# Patient Record
Sex: FEMALE | Race: WHITE | NOT HISPANIC OR LATINO | Employment: UNEMPLOYED | URBAN - METROPOLITAN AREA
[De-identification: names, ages, dates, MRNs, and addresses within clinical notes are randomized per-mention and may not be internally consistent; named-entity substitution may affect disease eponyms.]

---

## 2017-03-13 ENCOUNTER — ALLSCRIPTS OFFICE VISIT (OUTPATIENT)
Dept: OTHER | Facility: OTHER | Age: 37
End: 2017-03-13

## 2018-01-11 NOTE — PROGRESS NOTES
2016         RE: Karla Francissantiago                                    To: [facility]  MR#: [id number]                                    [address]   : [address]                                  [id number]   ENC: [id number]                             Alex, [address]   (Exam #: [id number])                           Fax: [phone]      The LMP of this 37year old,  3, para 2 patient was 2015,   giving her an TERRI of 2016 and a current gestational age of 36 weeks   0 days by dates  A sonographic examination was performed on 2016   using real time equipment  The ultrasound examination was performed using   abdominal technique  The patient has a BMI of 31 5  Her blood pressure   today was 135/81  Earliest ultrasound found in her record: 2015  11w2d  2016 TERRI      Cardiac motion was observed at 159 bpm       INDICATIONS      post dates      Exam Types      Amniotic Fluid Index      RESULTS      Fetus # 1 of 1   Vertex presentation   Placenta Location = Anterior   No placenta previa   Placenta Grade = II      AMNIOTIC FLUID      Q1: 2 6      Q2: 1 4      Q3: 2 9      Q4:   RICHARD Total = 6 9 cm   Amniotic Fluid: Normal      BIOPHYSICAL PROFILE      The Biophysical Profile score was 8/8  Breathin  Movement: 2  Tone: 2  AFV: 2      IMPRESSION      Guzman IUP   Vertex presentation   Regular fetal heart rate of 159 bpm   Anterior placenta   No placenta previa      GENERAL COMMENT         The nonstress test today showed a normal baseline, the presence of   accelerations, moderate variability, and no decelerations and thus the NST   was reassuring  The amniotic fluid was normal today as well and thus both   of these tests showed reassuring results  SHILA Amaya , R DELMIS Smith     Maternal-Fetal Medicine   Electronically signed 16 16:26

## 2018-01-12 VITALS
HEIGHT: 70 IN | SYSTOLIC BLOOD PRESSURE: 110 MMHG | DIASTOLIC BLOOD PRESSURE: 72 MMHG | WEIGHT: 196 LBS | BODY MASS INDEX: 28.06 KG/M2

## 2018-01-12 NOTE — PROGRESS NOTES
2016         RE: Edward Subharodney                                    To: [facility]  MR#: [id number]                                    [address]   : 1980                                  [address]   [facility]: [id number]:[facility]                             Nusrat Puente, [address]   (Exam #: [id number])                           Fax: [phone]      The LMP of this 37year old,  3, para 2 patient was 2015,   giving her an TERRI of 2016 and a current gestational age of 36 weeks   4 days by dates  A sonographic examination was performed on 2016   using real time equipment  The ultrasound examination was performed using   abdominal technique  The patient has a BMI of 30 8  Her blood pressure   today was 136/87  Earliest ultrasound found in her record: 2015  11w2d  2016 TERRI      Bashir Avery has no complaints her visit today  She reports regular fetal   movements  She is scheduled for admission for cervical ripening and labor   induction tomorrow night  Cardiac motion was observed at 146 bpm       INDICATIONS      post dates      Exam Types      Level I   NST      RESULTS      Fetus # 1 of 1   Vertex presentation   Fetal growth appeared normal   Placenta Location = Anterior   No placenta previa   Placenta Grade = II      The NST was reactive with no decelerations  MEASUREMENTS (* Included In Average GA)      AC              33 6 cm        37 weeks 6 days*   BPD              9 5 cm        38 weeks 5 days*   HC              34 7 cm        39 weeks 4 days*   Femur            8 1 cm        40 weeks 2 days*      Cerebellum       5 5 cm        36 weeks 4 days      HC/AC           1 03   FL/AC           0 24   FL/BPD          0 85   EFW (Ac/Fl/Hc)  3634 grams - 8 lbs 0 oz      THE AVERAGE GESTATIONAL AGE is 39 weeks 1 day +/- 21 days        AMNIOTIC FLUID      Q1: 6 1      Q2: 2 0      Q3: 1 1      Q4:   RICHARD Total = 9 1 cm   Amniotic Fluid: Normal IMPRESSION      Guzman IUP   39 weeks and 1 day by this ultrasound  (TERRI=APR 24 2016)   Vertex presentation   Fetal growth appeared normal   Regular fetal heart rate of 146 bpm   Anterior placenta   No placenta previa      GENERAL COMMENT      No fetal structural abnormality is identified on the Level I survey today   in a limited study secondary to the constraints related to the late   gestational age  Cardiac anatomy is suboptimally imaged  The fetal brain,   stomach, kidneys, bladder, and diaphragm appear normal  Fetal interval   growth and amniotic fluid volume are normal       Today's ultrasound findings and suggested follow-up were discussed in   detail with Arun Nicole  We agree with labor induction at this gestational age  No further appointment has been scheduled in the Count includes the [facility], [facility]  for Arun Nicole   at this time  Follow-up M ultrasound evaluation is recommended as   clinically indicated  Daily third trimester fetal kick counting was   discussed at the visit today  The face to face time, in addition to time spent discussing ultrasound   results, was 10 minutes, greater than 50% of which was spent during   counseling and coordination of care  SHILA Schuster M D     Maternal-Fetal Medicine   Electronically signed 04/18/16 14:07

## 2018-01-15 NOTE — MISCELLANEOUS
Chief Complaint  Chief Complaint Free Text Note Form: No STAN was needed for this patient because she was in the hospital for the birth of a child  Active Problems    1  History of macrosomia in infant in prior pregnancy, currently pregnant (V23 49) (O09 299)   2  Need for prophylactic vaccination and inoculation against influenza (V04 81) (Z23)   3  Need for prophylactic vaccination with combined diphtheria-tetanus-pertussis (DTaP)   vaccine (V06 1) (Z23)   4  Postprocedural hypothyroidism (244 0) (E89 0)   5  Post-term pregnancy, 40-42 weeks of gestation (645 10) (O48 0)   6  Pregnancy (V22 2) (Z33 1)   7  Short interval between pregnancies affecting pregnancy in third trimester, antepartum   (V23 89) (O09 893)   8  Urinary frequency (788 41) (R35 0)    Past Medical History    1  History of Age At First Period 17 Years Old (Menarche)   2  History of Asthma (493 90) (J45 909)   3  History of Bunion, unspecified laterality (727 1) (M20 10)   4  History of Contraception (V25 9) (Z30 9)   5  History of Encounter for routine gynecological examination (V72 31) (Z01 419)   6  History of  2 (V22 2) (Z33 1)   7  History of Group B streptococcal infection (041 02) (A49 1)   8  History of headache (V13 89) (Z87 898)   9  History of hypothyroidism (V12 29) (Z86 39)   10  History of Oral contraceptive use (V25 41) (Z30 41)   11  History of  (spontaneous vaginal delivery) (650) (O80)    Surgical History    1  History of Foot Surgery   2  History of Oral Surgery Tooth Extraction   3  Thyroid Surgery Thyroid Lobectomy Right Lobe    Family History    1  Family history of hypertension (V17 49) (Z82 49)    2  Family history of hypertension (V17 49) (Z82 49)    3  Family history of Diabetes Mellitus (V18 0)    4  Family history of Diabetes Mellitus (V18 0)   5  Family history of Thyroid Disorder (V18 19)    6  Family history of Diabetes Mellitus (V18 0)    7   Family history of type 1 diabetes mellitus (V18 0) (Z83 3)    Social History    · Being A Social Drinker   · Currently sexually active   · Daily Coffee Consumption (2  Cups/Day)   · Drinks beer (V49 89) (Z78 9)   · Denied: History of Drug Use   · Exercise: Running   · Exercising Regularly   · Marital History - Currently    · Never A Smoker   · Uses Safety Equipment - Seatbelts    Current Meds   1  Complete Prenatal/DHA MISC; Therapy: (Recorded:29Rky8409) to Recorded   2  Tylenol Extra Strength 500 MG Oral Tablet; Therapy: (Recorded:55Nhp4089) to Recorded    Allergies    1  Penicillins    2  Animal dander - Cats   3  No Known Environmental Allergies   4  No Known Food Allergies    Future Appointments    Date/Time Provider Specialty Site   05/31/2016 09:20 AM DELMIS Casey   Obstetrics/Gynecology [facility]   Electronically signed by : Quinn Kimbrough, ; Apr 25 2016 10:14AM EST                       (Author)    Electronically signed by : DELMIS Lange ; Apr 25 2016 12:37PM EST                       (Author)

## 2018-04-04 ENCOUNTER — ANNUAL EXAM (OUTPATIENT)
Dept: OBGYN CLINIC | Facility: CLINIC | Age: 38
End: 2018-04-04
Payer: COMMERCIAL

## 2018-04-04 VITALS
BODY MASS INDEX: 29.49 KG/M2 | WEIGHT: 206 LBS | DIASTOLIC BLOOD PRESSURE: 66 MMHG | HEIGHT: 70 IN | SYSTOLIC BLOOD PRESSURE: 118 MMHG

## 2018-04-04 DIAGNOSIS — Z01.419 ENCOUNTER FOR GYNECOLOGICAL EXAMINATION (GENERAL) (ROUTINE) WITHOUT ABNORMAL FINDINGS: Primary | ICD-10-CM

## 2018-04-04 DIAGNOSIS — Z11.51 SCREENING FOR HPV (HUMAN PAPILLOMAVIRUS): ICD-10-CM

## 2018-04-04 PROCEDURE — 87624 HPV HI-RISK TYP POOLED RSLT: CPT | Performed by: PHYSICIAN ASSISTANT

## 2018-04-04 PROCEDURE — G0145 SCR C/V CYTO,THINLAYER,RESCR: HCPCS | Performed by: PHYSICIAN ASSISTANT

## 2018-04-04 PROCEDURE — S0612 ANNUAL GYNECOLOGICAL EXAMINA: HCPCS | Performed by: PHYSICIAN ASSISTANT

## 2018-04-04 NOTE — PROGRESS NOTES
Assessment/Plan  Problem List Items Addressed This Visit     Encounter for gynecological examination (general) (routine) without abnormal findings - Primary     Annual exam and pap performed, will call if abnormal or send letter if normal   RTO 1 year             Subjective   Kj Squires is a 30 y o  female who presents for annual GYN exam  She denies any changes in Medical, Surgical or Family  Periods are regular every 28-30 days  Dysmenorrhea:none  She denies intermenstrual bleeding, spotting, or discharge  She reports that she is sexually active with 1 partner and using vasectomy for contraception  Last Pap smear: 3/2015 WNL, HPV negative  Regular self breast exam: no    Family history of uterine or ovarian cancer: no  Family history of breast cancer: no  Family history of colon cancer: no    Menstrual History:  OB History      Para Term  AB Living    3 3 3     3    SAB TAB Ectopic Multiple Live Births          0 3        Obstetric Comments    MENARCHE: 17YEARS OLD        Patient's last menstrual period was 2018 (approximate)  Period Cycle (Days): 30  Period Duration (Days): 4-5  Period Pattern: Regular  Menstrual Flow: Heavy, Light  Menstrual Control: Thin pad  Dysmenorrhea: None    The following portions of the patient's history were reviewed and updated as appropriate: allergies, current medications, past family history, past medical history, past social history, past surgical history and problem list     Review of Systems  Review of Systems   Constitutional: Negative for chills and fever  Respiratory: Negative for shortness of breath  Cardiovascular: Negative for chest pain  Gastrointestinal: Negative for abdominal pain  Genitourinary: Negative for dysuria, pelvic pain, vaginal bleeding, vaginal discharge and vaginal pain  Negative for breast pain or lumps  (+) for occasional stress urinary incontinence  Neurological: Negative for headaches       Objective   BP 118/66 (BP Location: Right arm, Patient Position: Sitting, Cuff Size: Standard)   Ht 5' 9 5" (1 765 m)   Wt 93 4 kg (206 lb)   LMP 03/22/2018 (Approximate)   Breastfeeding? No   BMI 29 98 kg/m²     Physical Exam   Constitutional: She is oriented to person, place, and time  She appears well-developed and well-nourished  Neck: No thyromegaly present  Cardiovascular: Normal rate and regular rhythm  Pulmonary/Chest: Effort normal and breath sounds normal  Right breast exhibits no mass, no nipple discharge, no skin change and no tenderness  Left breast exhibits no mass, no nipple discharge, no skin change and no tenderness  Abdominal: Soft  There is no tenderness  There is no rebound and no guarding  Genitourinary: There is no rash or lesion on the right labia  There is no rash or lesion on the left labia  Uterus is not enlarged and not tender  Cervix exhibits no motion tenderness and no discharge  Right adnexum displays no mass and no tenderness  Left adnexum displays no mass and no tenderness  No bleeding in the vagina  No vaginal discharge found  Genitourinary Comments: Pap performed   Neurological: She is alert and oriented to person, place, and time  Psychiatric: She has a normal mood and affect  Nursing note and vitals reviewed

## 2018-04-06 LAB — HPV RRNA GENITAL QL NAA+PROBE: NORMAL

## 2018-04-09 LAB
LAB AP GYN PRIMARY INTERPRETATION: NORMAL
LAB AP LMP: NORMAL
Lab: NORMAL

## 2019-04-11 ENCOUNTER — ANNUAL EXAM (OUTPATIENT)
Dept: OBGYN CLINIC | Facility: CLINIC | Age: 39
End: 2019-04-11
Payer: COMMERCIAL

## 2019-04-11 VITALS
SYSTOLIC BLOOD PRESSURE: 114 MMHG | HEIGHT: 70 IN | WEIGHT: 192 LBS | BODY MASS INDEX: 27.49 KG/M2 | DIASTOLIC BLOOD PRESSURE: 72 MMHG

## 2019-04-11 DIAGNOSIS — Z01.419 ENCOUNTER FOR GYNECOLOGICAL EXAMINATION (GENERAL) (ROUTINE) WITHOUT ABNORMAL FINDINGS: Primary | ICD-10-CM

## 2019-04-11 PROCEDURE — S0612 ANNUAL GYNECOLOGICAL EXAMINA: HCPCS | Performed by: PHYSICIAN ASSISTANT

## 2020-01-21 ENCOUNTER — TELEPHONE (OUTPATIENT)
Dept: INTERNAL MEDICINE CLINIC | Facility: CLINIC | Age: 40
End: 2020-01-21

## 2020-02-05 ENCOUNTER — TELEPHONE (OUTPATIENT)
Dept: INTERNAL MEDICINE CLINIC | Facility: CLINIC | Age: 40
End: 2020-02-05

## 2020-02-10 NOTE — TELEPHONE ENCOUNTER
02/10/20 7:26 AM     Thank you for your request  Your request has been received and reviewed  After careful review it was noted that the patient has been seen by the listed PCP and/or in the office within the past three years  Per protocol guidelines, this patient should remain on the patient panel  Please refer to [facility] tip sheet [id number] to review PCP Field Guidelines as defined by Leadership       Thank you  Valeri Fernandez

## 2020-02-20 NOTE — TELEPHONE ENCOUNTER
02/20/20 1:49 PM     Thank you for your request  Your request has been received, reviewed, and the patient chart updated  The PCP has successfully been removed with a patient attribution note  This message will now be completed  Thank you  Erickson Yeung    My apologies, I was looking at the referral name in 2017  Updated

## 2020-03-04 ENCOUNTER — OFFICE VISIT (OUTPATIENT)
Dept: INTERNAL MEDICINE CLINIC | Facility: CLINIC | Age: 40
End: 2020-03-04
Payer: COMMERCIAL

## 2020-03-04 VITALS
WEIGHT: 197.2 LBS | HEIGHT: 70 IN | SYSTOLIC BLOOD PRESSURE: 122 MMHG | BODY MASS INDEX: 28.23 KG/M2 | DIASTOLIC BLOOD PRESSURE: 78 MMHG | OXYGEN SATURATION: 96 % | HEART RATE: 84 BPM

## 2020-03-04 DIAGNOSIS — Z00.00 WELLNESS EXAMINATION: Primary | ICD-10-CM

## 2020-03-04 DIAGNOSIS — Z13.220 SCREENING, LIPID: ICD-10-CM

## 2020-03-04 DIAGNOSIS — Z13.1 SCREENING FOR DIABETES MELLITUS: ICD-10-CM

## 2020-03-04 PROBLEM — Z01.419 ENCOUNTER FOR GYNECOLOGICAL EXAMINATION (GENERAL) (ROUTINE) WITHOUT ABNORMAL FINDINGS: Status: RESOLVED | Noted: 2018-04-04 | Resolved: 2020-03-04

## 2020-03-04 PROCEDURE — 99385 PREV VISIT NEW AGE 18-39: CPT | Performed by: INTERNAL MEDICINE

## 2020-03-04 NOTE — PROGRESS NOTES
Assessment/Plan:      BMI Counseling: Body mass index is 28 3 kg/m²  The BMI is above normal  Exercise recommendations include exercising 3-5 times per week  Declined flu vaccine       Problem List Items Addressed This Visit     None      Visit Diagnoses     Wellness examination    -  Primary    Screening, lipid        Relevant Orders    Lipid panel    Screening for diabetes mellitus        Relevant Orders    Glucose, fasting            Subjective:      Patient ID: Trang Bartholomew is a 42 y o  female  HPI  Here for a wellness  I have not seen her in > 3 years  She received a certified letter in the mail to schedule a visit  Since her last visit, she has had 3 girls-7, 11 and 3 y/o  She is a teacher  She is generally doing well  No complications during her pregnancies  She denies smoking  She drinks socially  She has not had time to exercise but is active with her kids    The following portions of the patient's history were reviewed and updated as appropriate: allergies, past family history, past medical history, past social history, past surgical history and problem list     Review of Systems   Constitutional: Negative for chills, fatigue, fever and unexpected weight change  HENT: Negative for congestion, sinus pressure, sinus pain and sore throat  Respiratory: Negative for cough, shortness of breath and wheezing  Cardiovascular: Negative for chest pain, palpitations and leg swelling  Gastrointestinal: Negative for abdominal pain, constipation, diarrhea, nausea and vomiting  Genitourinary: Negative for difficulty urinating and menstrual problem  Musculoskeletal: Negative for arthralgias and myalgias  Neurological: Negative for dizziness and headaches  Objective:      /78   Pulse 84   Ht 5' 10" (1 778 m)   Wt 89 4 kg (197 lb 3 2 oz)   SpO2 96%   BMI 28 30 kg/m²          Physical Exam   Constitutional: She is oriented to person, place, and time   She appears well-developed and well-nourished  HENT:   Head: Normocephalic and atraumatic  Right Ear: External ear normal    Left Ear: External ear normal    Mouth/Throat: Oropharynx is clear and moist    Eyes: Conjunctivae are normal    Neck: Neck supple  Cardiovascular: Normal rate, regular rhythm and normal heart sounds  No murmur heard  Pulmonary/Chest: Effort normal and breath sounds normal  No respiratory distress  She has no wheezes  She has no rales  Abdominal: Soft  She exhibits no distension and no mass  There is no tenderness  There is no rebound and no guarding  Neurological: She is alert and oriented to person, place, and time  Skin: Skin is warm and dry  Psychiatric: She has a normal mood and affect   Her behavior is normal  Judgment and thought content normal

## 2020-08-26 ENCOUNTER — ANNUAL EXAM (OUTPATIENT)
Dept: OBGYN CLINIC | Facility: CLINIC | Age: 40
End: 2020-08-26
Payer: COMMERCIAL

## 2020-08-26 VITALS
SYSTOLIC BLOOD PRESSURE: 120 MMHG | BODY MASS INDEX: 30.66 KG/M2 | TEMPERATURE: 97.4 F | WEIGHT: 207 LBS | DIASTOLIC BLOOD PRESSURE: 74 MMHG | HEIGHT: 69 IN

## 2020-08-26 DIAGNOSIS — Z01.419 ENCNTR FOR GYN EXAM (GENERAL) (ROUTINE) W/O ABN FINDINGS: Primary | ICD-10-CM

## 2020-08-26 PROCEDURE — S0612 ANNUAL GYNECOLOGICAL EXAMINA: HCPCS | Performed by: PHYSICIAN ASSISTANT

## 2020-08-26 PROCEDURE — 3008F BODY MASS INDEX DOCD: CPT | Performed by: INTERNAL MEDICINE

## 2020-08-26 PROCEDURE — 3008F BODY MASS INDEX DOCD: CPT | Performed by: PHYSICIAN ASSISTANT

## 2020-08-26 NOTE — PROGRESS NOTES
Sienna Oseas  1980      CC:  Yearly exam    S:  42 y o  female here for yearly exam  Her cycles are regular, not heavy or crampy  Sexual activity: She is sexually active without pain, bleeding or dryness  Contraception: She uses vasectomy for contraception  She works as a teacher  Her 3 children will be going to [employer] this fall  Last Pap 4/4/18 neg/neg  Last Mammo never    We reviewed ASCCP guidelines for Pap testing today  Family hx of breast cancer: no  Family hx of ovarian cancer: no  Family hx of colon cancer: no    No current outpatient medications on file    Social History     Socioeconomic History    Marital status: /Civil Union     Spouse name: Pam Rangel    Number of children: 2    Years of education: Not on file    Highest education level: Not on file   Occupational History    Not on file   Social Needs    Financial resource strain: Not on file    Food insecurity     Worry: Not on file     Inability: Not on file    Transportation needs     Medical: Not on file     Non-medical: Not on file   Tobacco Use    Smoking status: Never Smoker    Smokeless tobacco: Never Used   Substance and Sexual Activity    Alcohol use: Yes     Frequency: 2-3 times a week     Drinks per session: 1 or 2     Binge frequency: Weekly    Drug use: No    Sexual activity: Yes     Partners: Male     Birth control/protection: Male Sterilization   Lifestyle    Physical activity     Days per week: Not on file     Minutes per session: Not on file    Stress: Not on file   Relationships    Social connections     Talks on phone: Not on file     Gets together: Not on file     Attends Gnosticism service: Not on file     Active member of club or organization: Not on file     Attends meetings of clubs or organizations: Not on file     Relationship status: Not on file    Intimate partner violence     Fear of current or ex partner: Not on file     Emotionally abused: Not on file     Physically abused: Not on file     Forced sexual activity: Not on file   Other Topics Concern    Not on file   Social History Narrative    Activities: treadmill    Daily coffee consumption (1 cups/day)    Exercise: running    Exercise: walking    Exercising regularly    Uses safety equipment- seatbelts      Family History   Problem Relation Age of Onset    Asthma Father     Hypertension Father     Diabetes Maternal Aunt     Thyroid disease Maternal Aunt     Diabetes Maternal Uncle     Diabetes Maternal Grandmother     Hypertension Mother     Diabetes type I Other     No Known Problems Sister     No Known Problems Sister       Past Medical History:   Diagnosis Date    Asthma     Bunion     RESOLVED: 2001    Disease of thyroid gland     Encounter for gynecological examination (general) (routine) without abnormal findings 4/4/2018    Hypothyroidism     Varicella     childhood        Review of Systems   Respiratory: Negative  Cardiovascular: Negative  Gastrointestinal: Negative for constipation and diarrhea  Genitourinary: Negative for difficulty urinating, pelvic pain, vaginal bleeding, vaginal discharge, itching or odor  O:  Blood pressure 120/74, temperature (!) 97 4 °F (36 3 °C), temperature source Tympanic, height 5' 9 29" (1 76 m), weight 93 9 kg (207 lb), last menstrual period 08/03/2020, not currently breastfeeding  Patient appears well and is not in distress  Neck is supple without masses  Breasts are symmetrical without mass, tenderness, nipple discharge, skin changes or adenopathy  Abdomen is soft and nontender without masses  External genitals are normal without lesions or rashes  Urethral meatus and urethra are normal  Bladder is normal to palpation  Vagina is normal without discharge or bleeding  Cervix is normal without discharge or lesion  Uterus is normal, mobile, nontender without palpable mass  Adnexa are normal, nontender, without palpable mass       A:  Yearly exam  P:   Pap 2023      RTO one year for yearly exam or sooner as needed

## 2021-03-26 DIAGNOSIS — Z23 ENCOUNTER FOR IMMUNIZATION: ICD-10-CM

## 2021-11-02 ENCOUNTER — ANNUAL EXAM (OUTPATIENT)
Dept: OBGYN CLINIC | Facility: CLINIC | Age: 41
End: 2021-11-02
Payer: COMMERCIAL

## 2021-11-02 VITALS
BODY MASS INDEX: 26.03 KG/M2 | SYSTOLIC BLOOD PRESSURE: 126 MMHG | DIASTOLIC BLOOD PRESSURE: 74 MMHG | WEIGHT: 181.8 LBS | HEIGHT: 70 IN

## 2021-11-02 DIAGNOSIS — Z01.419 ENCNTR FOR GYN EXAM (GENERAL) (ROUTINE) W/O ABN FINDINGS: Primary | ICD-10-CM

## 2021-11-02 PROCEDURE — S0612 ANNUAL GYNECOLOGICAL EXAMINA: HCPCS | Performed by: PHYSICIAN ASSISTANT

## 2023-02-15 ENCOUNTER — ANNUAL EXAM (OUTPATIENT)
Dept: OBGYN CLINIC | Facility: CLINIC | Age: 43
End: 2023-02-15

## 2023-02-15 VITALS
BODY MASS INDEX: 26.66 KG/M2 | HEIGHT: 72 IN | DIASTOLIC BLOOD PRESSURE: 94 MMHG | WEIGHT: 196.8 LBS | SYSTOLIC BLOOD PRESSURE: 124 MMHG

## 2023-02-15 DIAGNOSIS — Z01.419 ROUTINE GYNECOLOGICAL EXAMINATION: Primary | ICD-10-CM

## 2023-02-15 DIAGNOSIS — N39.41 URGENCY INCONTINENCE: ICD-10-CM

## 2023-02-15 DIAGNOSIS — Z11.51 SCREENING FOR HPV (HUMAN PAPILLOMAVIRUS): ICD-10-CM

## 2023-02-15 DIAGNOSIS — Z12.31 ENCOUNTER FOR SCREENING MAMMOGRAM FOR MALIGNANT NEOPLASM OF BREAST: ICD-10-CM

## 2023-02-15 NOTE — PATIENT INSTRUCTIONS
FOODS THAT MAY BE BLADDER IRRITANTS    The following selections show the most common foods which may worsen or irritate the bladder in patients with overactive bladder or interstitial cystitis  This does not mean that you need to avoid all of these foods, but you should pay close attention to how you feel after eating them  If you feel worse, then there is a good chance that this is a trigger food for you  Cranberry Juices and Extracts:   Cranberry juice may be the most frequent irritant in a patient’s diet  Recommended for consumption during urinary tract infections, cranberry juice can be very difficult for an overactive bladder to tolerate  If you don’t wish to give it up entirely, try diluting it with water by half or more  Coffee and Tea Products: In a sensitive bladder, coffee is believed to be the top bladder irritant  Some people can tolerate low acid coffees, while others try teas  For the most sensitive patient, the best option for a hot drink is hot water with honey  Carbonated beverages an sodas of any type (diet and regular): The most difficult soda to tolerate appears to be diet cola, which may consist of carbonation, caffeine, aspartame, and cocoa derivatives, four known bladder irritants  If you must have soda, try a clear, non-diet soda like Sprite  Tomatoes, Tofu, and some Beans:   Red tomatoes can be very acidic  Some patients can tolerate pizza and tomato sauces for pasta, while others cannot  Low acid yellow tomatoes may be good substitutes in pasta and salads  Naren, black, lima and soy beans are also potential irritants, due to their high acid content  Herbal teas:   Patients with overactive bladder can be sensitive to herb teas, particularly those that have many ingredients  If you cannot avoid herbal tea, try experimenting with one or two ingredients at a time, so you can tell if a particular herb bothers you      Tobacco     Alcohol and Vinegars:   Wine, beer, champagne and even wine sauces could be irritants due to their alcohol content  Some patients find white bam more tolerable than red bam (due to their reduced histamine content)  Chocolate  Chocolate is considered one of the worst irritants for the body  Strawberries and Acidic Fruits  Lemon, orange, grapefruit, pineapple, strawberries and plums have been known to inflame sensitive bladders  Fruit juices seem to be particularly difficult for some patients to tolerate because each can of juice may contain more acid than contained in one piece of fruit  Try low acid fruit juice, or diluted juice  Food Additives and Seasonings  Food additives are known to cause chemical sensitivities and allergic reactions in a small percentage of the general public, particularly monosodium glutamate (MSG), nitrates, and butylated hydroxytolene (BHT)  Monosodium Glutamate (MSG) is frequently found in prepared foods and mixes  Nitrates, found in prepared meats, have long been suspected as bladder irritants  Butylated hydroxytolene (BHT), is commonly found in cereals and breads  A careful review of ingredient labels will help identify foods that should be limited or eliminated from your diet  Bladder trainin  Start by going to the toilet and trying to urinate as often as your shortest voiding interval (length between trips to bathroom) -- I e  if urgency typically arises every 1 -2 hours, begin by urinating hourly  You will then attempt to void every hour, regardless of if you feel urge or not  (Do this during the day -- you do not need to get up at nighttime)   2  If you get urge to void prior to designated time/schedule do not rush to bathroom  Try distraction and/or relaxation techniques  (Don't run to the bathroom  Stand still or sit down  Deep breathing  Concentrate on/distract yourself with other tasks  Imagine the pressure becoming less and less)   When you feel in control of your symptoms, walk slowly to the bathroom  3  Continue on this schedule until you can go 1 day without leakage  Then you will increase scheduled trips to the bathroom by 15 minutes   Continue to extend time by 15 minutes until you can go 4 hours between trips to bathroom (which is considered a normal interval) or until you are confortable with interval

## 2023-02-16 NOTE — PROGRESS NOTES
Assessment   38 y o  [id number] presenting for annual exam      Plan   Diagnoses and all orders for this visit:    Routine gynecological examination  -     Liquid-based pap, screening  -     HPV High Risk    Normal findings on routine exam   Encouraged 150 min of exercise per week  Reviewed balanced diet  Multivitamin encouraged   Breast awareness/SBE encouraged     Encounter for screening mammogram for malignant neoplasm of breast  -     Mammo screening bilateral w 3d & cad; Future    Screening for HPV (human papillomavirus)  -     Liquid-based pap, screening  -     HPV High Risk    Urgency incontinence  -     Ambulatory referral to Physical Therapy; Future    Reviewed options for tx  To begin with bladder training and PT  Information on the same provided in AVS  F /u for recheck, sooner if worsening  Pap done today  Mammo slip given   Colonoscopy due at age 41        RTO one year for yearly exam or sooner as needed  __________________________________________________________________    Glory Sanders is a 38 y o  [id number] presenting for annual exam  She is without complaint and does not want STD testing today  SCREENING  Last Pap:  2018 NILM / hpv neg  Last Mammo: n/a  Last Colonoscopy: n/a       GYN    Periods are regular every 28-30 days, lasting 5-6 days  Dysmenorrhea:none  Cyclic symptoms include none    Sexually active: Yes - single partner -   Contraception: 's vasectomy  Hx STI: denies     Hx Abnormal pap: denies  We reviewed [facility]CCP guidelines for Pap testing today  Denies vaginal discharge, itching, odor, dyspareunia, pelvic pain and vulvar/vaginal symptoms      OB         Complaints: +urgency and occasional urge incontinence  Denies urgency, frequency, hematuria, leakage / change in stream, difficulty urinating         BREAST  Complaints: denies   Denies: breast lump, breast tenderness, nipple discharge, skin color change, and skin lesion(s)  Personal hx: denies    Pertinent Family Hx:   Family hx of breast cancer: no  Family hx of ovarian cancer: no  Family hx of colon cancer: no      GENERAL  PMH reviewed/updated and is as below  Patient does follow with a PCP  Past Medical History:   Diagnosis Date   • Asthma    • Bunion     RESOLVED: 2001   • Disease of thyroid gland    • Encounter for gynecological examination (general) (routine) without abnormal findings 4/4/2018   • Hypothyroidism    • Varicella     childhood       Past Surgical History:   Procedure Laterality Date   • BUNIONECTOMY Left    • THYROID LOBECTOMY Right 05/17/2004    W/ ISTHMUSECTOMY; HURTHLE CELL NEOPLASM   • TOOTH EXTRACTION         No current outpatient medications on file  Allergies   Allergen Reactions   • Penicillins Hives, Rash, Shortness Of Breath and Swelling   • Cat Hair Extract        Social History     Socioeconomic History   • Marital status: /Civil Union     Spouse name: Quan Ramos   • Number of children: 2   • Years of education: Not on file   • Highest education level: Not on file   Occupational History   • Not on file   Tobacco Use   • Smoking status: Never   • Smokeless tobacco: Never   Vaping Use   • Vaping Use: Never used   Substance and Sexual Activity   • Alcohol use:  Yes   • Drug use: No   • Sexual activity: Yes     Partners: Male     Birth control/protection: Male Sterilization     Comment:  had vasectomy   Other Topics Concern   • Not on file   Social History Narrative    Activities: treadmill    Daily coffee consumption (1 cups/day)    Exercise: running    Exercise: walking    Exercising regularly    Uses safety equipment- seatbelts      Social Determinants of Health     Financial Resource Strain: Not on file   Food Insecurity: Not on file   Transportation Needs: Not on file   Physical Activity: Not on file   Stress: Not on file   Social Connections: Not on file   Intimate Partner Violence: Not on file   Housing Stability: Not on file       Review of Systems     ROS:  Constitutional: Negative for fatigue and unexpected weight change  Respiratory: Negative for cough and shortness of breath  Cardiovascular: Negative for chest pain and palpitations  Gastrointestinal: Negative for abdominal pain and change in bowel habits  Breasts:  Negative, other than as noted above  Genitourinary: Negative, other than as noted above  Psychiatric: Negative for mood difficulties  Objective      /94 (BP Location: Right arm, Patient Position: Sitting, Cuff Size: Standard)   Ht 6' (1 829 m)   Wt 89 3 kg (196 lb 12 8 oz)   LMP 02/01/2023   BMI 26 69 kg/m²     Physical Examination:    Patient appears well and is not in distress  Neck is supple without masses  Breasts are symmetrical without mass, tenderness, nipple discharge, skin changes or adenopathy  Abdomen is soft and nontender without masses  External genitals are normal without lesions or rashes  Urethral meatus and urethra are normal  Bladder is normal to palpation  Vagina is normal without discharge or bleeding  Cervix is normal without discharge or lesion  Uterus is normal, mobile, nontender without palpable mass  Adnexa are normal, nontender, without palpable mass

## 2023-02-20 LAB
HPV HR 12 DNA CVX QL NAA+PROBE: NEGATIVE
HPV16 DNA CVX QL NAA+PROBE: NEGATIVE
HPV18 DNA CVX QL NAA+PROBE: NEGATIVE

## 2023-02-23 LAB
LAB AP GYN PRIMARY INTERPRETATION: NORMAL
Lab: NORMAL

## 2023-06-27 ENCOUNTER — TELEPHONE (OUTPATIENT)
Dept: OBGYN CLINIC | Facility: HOSPITAL | Age: 43
End: 2023-06-27

## 2023-06-27 NOTE — TELEPHONE ENCOUNTER
Caller: Patient     Doctor:     Reason for call: patient calling to see if there are any openings tomorrow or Thursday for her thumb  Checked available as of now, no   She will callback to check    Call back#: n/a

## 2023-06-27 NOTE — TELEPHONE ENCOUNTER
Caller: patient     Doctor: PABLO    Reason for call: Returning call  I advised her of my availability and she thinks she will go to urgent care first  Will cb if needed       Call back#: NA

## 2023-06-27 NOTE — TELEPHONE ENCOUNTER
I received a Care Request from patient to schedule for:  Where Does it Hurt? Thumb  Are you considering joint replacement? No   Are you seeking a second opinion? No  If yes, who is your doctor? I lvm to [facility] at [phone]

## 2024-02-09 ENCOUNTER — HOSPITAL ENCOUNTER (OUTPATIENT)
Dept: RADIOLOGY | Age: 44
Discharge: HOME | End: 2024-02-09
Payer: COMMERCIAL

## 2024-02-09 VITALS — WEIGHT: 196 LBS | BODY MASS INDEX: 26.55 KG/M2 | HEIGHT: 72 IN

## 2024-02-09 DIAGNOSIS — Z12.31 ENCOUNTER FOR SCREENING MAMMOGRAM FOR MALIGNANT NEOPLASM OF BREAST: ICD-10-CM

## 2024-02-09 PROCEDURE — 77067 SCR MAMMO BI INCL CAD: CPT

## 2024-02-09 PROCEDURE — 77063 BREAST TOMOSYNTHESIS BI: CPT
